# Patient Record
Sex: FEMALE | Race: WHITE | NOT HISPANIC OR LATINO | URBAN - METROPOLITAN AREA
[De-identification: names, ages, dates, MRNs, and addresses within clinical notes are randomized per-mention and may not be internally consistent; named-entity substitution may affect disease eponyms.]

---

## 2017-03-30 ENCOUNTER — APPOINTMENT (OUTPATIENT)
Age: 37
Setting detail: DERMATOLOGY
End: 2017-03-30

## 2017-03-30 ENCOUNTER — APPOINTMENT (RX ONLY)
Age: 37
Setting detail: DERMATOLOGY
End: 2017-03-30

## 2017-03-30 VITALS
SYSTOLIC BLOOD PRESSURE: 104 MMHG | SYSTOLIC BLOOD PRESSURE: 104 MMHG | DIASTOLIC BLOOD PRESSURE: 74 MMHG | DIASTOLIC BLOOD PRESSURE: 74 MMHG

## 2017-03-30 DIAGNOSIS — D18.0 HEMANGIOMA: ICD-10-CM

## 2017-03-30 DIAGNOSIS — D485 NEOPLASM OF UNCERTAIN BEHAVIOR OF SKIN: ICD-10-CM

## 2017-03-30 DIAGNOSIS — L81.4 OTHER MELANIN HYPERPIGMENTATION: ICD-10-CM

## 2017-03-30 DIAGNOSIS — Z87.2 PERSONAL HISTORY OF DISEASES OF THE SKIN AND SUBCUTANEOUS TISSUE: ICD-10-CM

## 2017-03-30 DIAGNOSIS — D22 MELANOCYTIC NEVI: ICD-10-CM

## 2017-03-30 PROBLEM — L29.8 OTHER PRURITUS: Status: ACTIVE | Noted: 2017-03-30

## 2017-03-30 PROBLEM — D22.5 MELANOCYTIC NEVI OF TRUNK: Status: ACTIVE | Noted: 2017-03-30

## 2017-03-30 PROBLEM — D18.01 HEMANGIOMA OF SKIN AND SUBCUTANEOUS TISSUE: Status: ACTIVE | Noted: 2017-03-30

## 2017-03-30 PROBLEM — D48.5 NEOPLASM OF UNCERTAIN BEHAVIOR OF SKIN: Status: ACTIVE | Noted: 2017-03-30

## 2017-03-30 PROCEDURE — 99214 OFFICE O/P EST MOD 30 MIN: CPT | Mod: 25

## 2017-03-30 PROCEDURE — 11100: CPT

## 2017-03-30 PROCEDURE — ? COUNSELING

## 2017-03-30 PROCEDURE — ? BIOPSY BY SHAVE METHOD

## 2017-03-30 ASSESSMENT — LOCATION DETAILED DESCRIPTION DERM
LOCATION DETAILED: MIDDLE STERNUM
LOCATION DETAILED: RIGHT LATERAL SUPERIOR CHEST
LOCATION DETAILED: RIGHT LATERAL SUPERIOR CHEST
LOCATION DETAILED: LEFT ANTERIOR PROXIMAL THIGH
LOCATION DETAILED: RIGHT SUPERIOR MEDIAL UPPER BACK
LOCATION DETAILED: LEFT ANTERIOR PROXIMAL THIGH
LOCATION DETAILED: RIGHT SUPERIOR MEDIAL UPPER BACK
LOCATION DETAILED: LEFT MID-UPPER BACK
LOCATION DETAILED: SUPERIOR THORACIC SPINE
LOCATION DETAILED: SUPERIOR THORACIC SPINE
LOCATION DETAILED: MIDDLE STERNUM
LOCATION DETAILED: LEFT MID-UPPER BACK

## 2017-03-30 ASSESSMENT — LOCATION SIMPLE DESCRIPTION DERM
LOCATION SIMPLE: LEFT THIGH
LOCATION SIMPLE: LEFT UPPER BACK
LOCATION SIMPLE: CHEST
LOCATION SIMPLE: RIGHT UPPER BACK
LOCATION SIMPLE: LEFT THIGH
LOCATION SIMPLE: UPPER BACK
LOCATION SIMPLE: RIGHT UPPER BACK
LOCATION SIMPLE: CHEST
LOCATION SIMPLE: LEFT UPPER BACK
LOCATION SIMPLE: UPPER BACK

## 2017-03-30 ASSESSMENT — LOCATION ZONE DERM
LOCATION ZONE: TRUNK
LOCATION ZONE: LEG
LOCATION ZONE: TRUNK
LOCATION ZONE: LEG

## 2017-03-30 NOTE — PROCEDURE: MIPS QUALITY
Detail Level: Detailed
Quality 431: Preventive Care And Screening: Unhealthy Alcohol Use - Screening: Patient screened for unhealthy alcohol use using a single question and scores less than 2 times per year
Quality 110: Preventive Care And Screening: Influenza Immunization: Influenza Immunization Ordered or Recommended, but not Administered
Quality 226: Preventive Care And Screening: Tobacco Use: Screening And Cessation Intervention: Patient screened for tobacco and never smoked
Quality 111:Pneumonia Vaccination Status For Older Adults: Pneumococcal Vaccination not Administered or Previously Received, Reason not Otherwise Specified
Quality 130: Documentation Of Current Medications In The Medical Record: Current Medications Documented

## 2017-03-30 NOTE — PROCEDURE: BIOPSY BY SHAVE METHOD
Cryotherapy Text: The wound bed was treated with cryotherapy after the biopsy was performed.
Wound Care: Bacitracin
Render Post-Care Instructions In Note?: yes
Biopsy Type: H and E
Curettage Text: The wound bed was treated with curettage after the biopsy was performed.
Consent was obtained and risks were reviewed including but not limited to scarring, infection, bleeding, scabbing, incomplete removal, nerve damage and allergy to anesthesia.
Silver Nitrate Text: The wound bed was treated with silver nitrate after the biopsy was performed.
Anesthesia Type: 0.5% lidocaine with 1:200,000 epinephrine and a 1:10 solution of 8.4% sodium bicarbonate
Anesthesia Volume In Cc: 0.5
Electrodesiccation And Curettage Text: The wound bed was treated with electrodesiccation and curettage after the biopsy was performed.
Electrodesiccation Text: The wound bed was treated with electrodesiccation after the biopsy was performed.
Bill For Surgical Tray: no
Notification Instructions: Patient will be notified of biopsy results. However, patient instructed to call the office if not contacted within 2 weeks.
Dressing: bandage
Hemostasis: Aluminum Chloride
Body Location Override (Optional - Billing Will Still Be Based On Selected Body Map Location If Applicable): Left proximal Posterior Medial Thigh
X Size Of Lesion In Cm: 0
Post-Care Instructions: I reviewed with the patient in detail post-care instructions. Patient is to keep the biopsy site dry overnight, wash with soap and water and then apply ointment daily healed.
Detail Level: Simple
Billing Type: Third-Party Bill
Biopsy Method: Double edge Personna blades
Size Of Lesion In Cm: 0.3
Type Of Destruction Used: Curettage

## 2017-03-30 NOTE — PROCEDURE: MIPS QUALITY
Quality 110: Preventive Care And Screening: Influenza Immunization: Influenza Immunization Ordered or Recommended, but not Administered
Quality 226: Preventive Care And Screening: Tobacco Use: Screening And Cessation Intervention: Patient screened for tobacco and never smoked
Quality 111:Pneumonia Vaccination Status For Older Adults: Pneumococcal Vaccination not Administered or Previously Received, Reason not Otherwise Specified
Quality 130: Documentation Of Current Medications In The Medical Record: Current Medications Documented
Detail Level: Detailed
Quality 431: Preventive Care And Screening: Unhealthy Alcohol Use - Screening: Patient screened for unhealthy alcohol use using a single question and scores less than 2 times per year

## 2017-03-30 NOTE — PROCEDURE: BIOPSY BY SHAVE METHOD
Additional Anesthesia Volume In Cc (Will Not Render If 0): 0
Bill [id number] For Specimen Handling/Conveyance To Laboratory?: no
Lab: -[id number]
Wound Care: Bacitracin
Billing Type: Third-Party Bill
Notification Instructions: Patient will be notified of biopsy results. However, patient instructed to call the office if not contacted within 2 weeks.
Cryotherapy Text: The wound bed was treated with cryotherapy after the biopsy was performed.
Detail Level: Simple
Anesthesia Volume In Cc: 0.5
Body Location Override (Optional - Billing Will Still Be Based On Selected Body Map Location If Applicable): Left proximal Posterior Medial Thigh
Silver Nitrate Text: The wound bed was treated with silver nitrate after the biopsy was performed.
Dressing: bandage
Size Of Lesion In Cm: 0.3
Curettage Text: The wound bed was treated with curettage after the biopsy was performed.
Biopsy Method: Double edge Personna blades
Anesthesia Type: 0.5% lidocaine with 1:200,000 epinephrine and a 1:10 solution of 8.4% sodium bicarbonate
Post-Care Instructions: I reviewed with the patient in detail post-care instructions. Patient is to keep the biopsy site dry overnight, wash with soap and water and then apply ointment daily healed.
Render Post-Care Instructions In Note?: yes
Biopsy Type: H and E
Hemostasis: Aluminum Chloride
Consent was obtained and risks were reviewed including but not limited to scarring, infection, bleeding, scabbing, incomplete removal, nerve damage and allergy to anesthesia.
Electrodesiccation And Curettage Text: The wound bed was treated with electrodesiccation and curettage after the biopsy was performed.
Electrodesiccation Text: The wound bed was treated with electrodesiccation after the biopsy was performed.
Type Of Destruction Used: Curettage

## 2017-09-20 ENCOUNTER — APPOINTMENT (OUTPATIENT)
Age: 37
Setting detail: DERMATOLOGY
End: 2017-09-20

## 2017-09-20 ENCOUNTER — APPOINTMENT (RX ONLY)
Age: 37
Setting detail: DERMATOLOGY
End: 2017-09-20

## 2017-09-20 VITALS
DIASTOLIC BLOOD PRESSURE: 68 MMHG | DIASTOLIC BLOOD PRESSURE: 68 MMHG | SYSTOLIC BLOOD PRESSURE: 104 MMHG | SYSTOLIC BLOOD PRESSURE: 104 MMHG

## 2017-09-20 DIAGNOSIS — H61.03 CHONDRITIS OF EXTERNAL EAR: ICD-10-CM

## 2017-09-20 DIAGNOSIS — D22 MELANOCYTIC NEVI: ICD-10-CM

## 2017-09-20 PROBLEM — D22.5 MELANOCYTIC NEVI OF TRUNK: Status: ACTIVE | Noted: 2017-09-20

## 2017-09-20 PROBLEM — H61.031 CHONDRITIS OF RIGHT EXTERNAL EAR: Status: ACTIVE | Noted: 2017-09-20

## 2017-09-20 PROCEDURE — ? COUNSELING

## 2017-09-20 PROCEDURE — 99214 OFFICE O/P EST MOD 30 MIN: CPT

## 2017-09-20 ASSESSMENT — LOCATION ZONE DERM
LOCATION ZONE: EAR
LOCATION ZONE: TRUNK
LOCATION ZONE: EAR
LOCATION ZONE: TRUNK

## 2017-09-20 ASSESSMENT — LOCATION DETAILED DESCRIPTION DERM
LOCATION DETAILED: RIGHT SUPERIOR CRUS OF ANTIHELIX
LOCATION DETAILED: RIGHT SUPERIOR MEDIAL UPPER BACK
LOCATION DETAILED: LEFT MEDIAL SUPERIOR CHEST
LOCATION DETAILED: RIGHT SUPERIOR MEDIAL UPPER BACK
LOCATION DETAILED: RIGHT SUPERIOR CRUS OF ANTIHELIX
LOCATION DETAILED: LEFT MEDIAL SUPERIOR CHEST

## 2017-09-20 ASSESSMENT — LOCATION SIMPLE DESCRIPTION DERM
LOCATION SIMPLE: RIGHT UPPER BACK
LOCATION SIMPLE: RIGHT UPPER BACK
LOCATION SIMPLE: CHEST
LOCATION SIMPLE: CHEST
LOCATION SIMPLE: RIGHT EAR
LOCATION SIMPLE: RIGHT EAR

## 2017-09-20 NOTE — PROCEDURE: MIPS QUALITY
Quality 226: Preventive Care And Screening: Tobacco Use: Screening And Cessation Intervention: Patient screened for tobacco and never smoked
Detail Level: Detailed
Quality 431: Preventive Care And Screening: Unhealthy Alcohol Use - Screening: Patient screened for unhealthy alcohol use using a single question and scores less than 2 times per year
Quality 111:Pneumonia Vaccination Status For Older Adults: Pneumococcal Vaccination not Administered or Previously Received, Reason not Otherwise Specified
Quality 110: Preventive Care And Screening: Influenza Immunization: Influenza Immunization Ordered or Recommended, but not Administered due to system reason
Quality 130: Documentation Of Current Medications In The Medical Record: Current Medications Documented

## 2018-06-28 ENCOUNTER — APPOINTMENT (RX ONLY)
Age: 38
Setting detail: DERMATOLOGY
End: 2018-06-28

## 2018-06-28 ENCOUNTER — APPOINTMENT (OUTPATIENT)
Age: 38
Setting detail: DERMATOLOGY
End: 2018-06-28

## 2018-06-28 DIAGNOSIS — Z41.9 ENCOUNTER FOR PROCEDURE FOR PURPOSES OTHER THAN REMEDYING HEALTH STATE, UNSPECIFIED: ICD-10-CM

## 2018-06-28 PROCEDURE — ? PRODUCT LINE (ANTI-AGING)

## 2018-06-28 ASSESSMENT — LOCATION SIMPLE DESCRIPTION DERM
LOCATION SIMPLE: LEFT CHEEK
LOCATION SIMPLE: LEFT CHEEK

## 2018-06-28 ASSESSMENT — LOCATION ZONE DERM
LOCATION ZONE: FACE
LOCATION ZONE: FACE

## 2018-06-28 ASSESSMENT — LOCATION DETAILED DESCRIPTION DERM
LOCATION DETAILED: LEFT SUPERIOR CENTRAL MALAR CHEEK
LOCATION DETAILED: LEFT SUPERIOR CENTRAL MALAR CHEEK

## 2018-06-28 NOTE — PROCEDURE: PRODUCT LINE (ANTI-AGING)
Send Charges To Patient Encounter: Yes
Product 22 Units: 0
Name Of Product 52: Acne Kit
Product 63 Price (In Dollars - Numeric Only, No Special Characters Or $): 0.00
Product 15 Application Directions: Apply to entire face as directed
Product 40 Application Directions: Apply to entire face BID right after cleansing and toning
Product 27 Price (In Dollars - Numeric Only, No Special Characters Or $): 74.55
Product 45 Price (In Dollars - Numeric Only, No Special Characters Or $): 45
Product 37 Application Directions: Qam
Product 13 Application Directions: Apply to entire face as directed and Peel protocol given to do a mild peel with 10 pumps BID
Product 6 Application Directions: Apply to entire face BID
Name Of Product 48: Norbertove
Product 51 Application Directions: Apply every other night as tolerated
Product 20 Price (In Dollars - Numeric Only, No Special Characters Or $): 154.43
Product 50 Application Directions: As directed
Name Of Product 45: Sulfa Mask
Product 36 Application Directions: Twice a day post Daily Power Defense or [employer]
Product 36 Price (In Dollars - Numeric Only, No Special Characters Or $): 88
Name Of Product 37: Smartone 50 SPF
Product 22 Application Directions: Apply to face as directed on packaging
Name Of Product 21: Ossentials SPF Primer
Product 33 Price (In Dollars - Numeric Only, No Special Characters Or $): 181.05
Product 21 Application Directions: Apply QAM
Product 24 Application Directions: Cleanse face and neck BID
Product 43 Price (In Dollars - Numeric Only, No Special Characters Or $): 120.00
Product 12 Application Directions: Cleanse face BID
Product 47 Application Directions: Exfoliate face after cleansing 3 times a week
Name Of Product 49: Exfoliating cleanser travel size
Name Of Product 33: Radical night repair
Name Of Product 34: Exfoliating cleanser
Product 16 Price (In Dollars - Numeric Only, No Special Characters Or $): 104
Name Of Product 25: Ossentials SPF 30 Primer
Product 11 Application Directions: Swab to entire face BID after cleansing.
Product 53 Application Directions: Apply QHS to transition from HQ4 therapy.
Product 2 Application Directions: Apply to periorbital region BID
Name Of Product 9: Gentle Cleanser
Name Of Product 50: Skin care kit Level I
Name Of Product 17: C Bright
Name Of Product 29: LetsVenture SPF Travel size
Product 52 Price (In Dollars - Numeric Only, No Special Characters Or $): 140
Product 40 Price (In Dollars - Numeric Only, No Special Characters Or $): 63.90
Product 20 Application Directions: Apply to entire face QHS PRN
Product 25 Price (In Dollars - Numeric Only, No Special Characters Or $): 65
Name Of Product 5: Smartone SPF 50
Name Of Product 28: TE PADS
Product 7 Application Directions: BID
Product 16 Application Directions: Apply to entire face QHS
Product 27 Application Directions: Apply to entire face QAM
Name Of Product 1: Brightenex 1%
Product 48 Price (In Dollars - Numeric Only, No Special Characters Or $): 120
Name Of Product 51: Retamax travel size
Product 7 Price (In Dollars - Numeric Only, No Special Characters Or $): 125
Name Of Product 3: Growth Factor Serum
Product 23 Price (In Dollars - Numeric Only, No Special Characters Or $): 138.45
Name Of Product 7: Melamin C
Name Of Product 14: Hydrafirm
Product 3 Price (In Dollars - Numeric Only, No Special Characters Or $): 157.62
Name Of Product 36: Rozatrol
Name Of Product 27: Exfoliating accelerator
Name Of Product 44: Colorscience Holiday Kit
Product 52 Application Directions: Apply as indicated BID
Name Of Product 20: Wrinkle-texture Repair (former Retamax)
Product 17 Price (In Dollars - Numeric Only, No Special Characters Or $): 99.05
Product 9 Price (In Dollars - Numeric Only, No Special Characters Or $): 47.93
Name Of Product 12: Suresh
Name Of Product 10: Dual action scrub
Product 41 Application Directions: Apply to face and neck once a day
Product 8 Price (In Dollars - Numeric Only, No Special Characters Or $): 255.19
Product 23 Application Directions: Periorbital BID
Product 23 Units: 1
Product 4 Application Directions: Exfoliate 3 x week in mornings
Product 28 Price (In Dollars - Numeric Only, No Special Characters Or $): 54.32
Name Of Product 30: Social Pulse Travel Size
Product 47 Price (In Dollars - Numeric Only, No Special Characters Or $): 18
Name Of Product 42: Intensive eye repair
Name Of Product 8: ZO Level II Antiaging kit
Name Of Product 24: Clarisonic Hortensia 2
Product 10 Price (In Dollars - Numeric Only, No Special Characters Or $): 80
Product 32 Application Directions: Apply to dark circles QHS
Name Of Product 53: Retinol Skin Brightener 0.5%
Name Of Product 13: ZO SKIN HEALTH AGGRESSIVE ANTI-AGING LEVEL III
Product 44 Price (In Dollars - Numeric Only, No Special Characters Or $): 39
Name Of Product 38: Advanced Radical Night Repair
Product 26 Price (In Dollars - Numeric Only, No Special Characters Or $): 154.42
Name Of Product 4: Exfoliating polish
Product 15 Price (In Dollars - Numeric Only, No Special Characters Or $): 318.43
Product 37 Price (In Dollars - Numeric Only, No Special Characters Or $): 74.9
Name Of Product 35: Vitascrub travel size
Product 19 Price (In Dollars - Numeric Only, No Special Characters Or $): 93.72
Product 50 Price (In Dollars - Numeric Only, No Special Characters Or $): 170.40
Product 6 Price (In Dollars - Numeric Only, No Special Characters Or $): 159.77
Product 2 Price (In Dollars - Numeric Only, No Special Characters Or $): 130
Product 34 Price (In Dollars - Numeric Only, No Special Characters Or $): 41.54
Name Of Product 31: ZO Skin Health Level I
Product 11 Price (In Dollars - Numeric Only, No Special Characters Or $): 66.03
Product 21 Price (In Dollars - Numeric Only, No Special Characters Or $): 69.55
Product 5 Application Directions: Apply QAM before makeup
Product 30 Application Directions: Apply a pump 2 x week at bedtime following Daily Power Defense
Product 31 Price (In Dollars - Numeric Only, No Special Characters Or $): 160
Product 38 Application Directions: Every night as tolerated
Product 14 Application Directions: Apply to periorbital region 3 x week at night
Product 35 Price (In Dollars - Numeric Only, No Special Characters Or $): 50.05
Product 41 Price (In Dollars - Numeric Only, No Special Characters Or $): 103
Product 1 Application Directions: Apply to entire face every two nights.
Name Of Product 15: Antiaging level 3 kit
Product 43 Application Directions: Apply to entire face twice daily
Detail Level: Zone
Product 10 Application Directions: Scrub entire face QD in the shower
Product 30 Price (In Dollars - Numeric Only, No Special Characters Or $): 56.00
Product 46 Price (In Dollars - Numeric Only, No Special Characters Or $): 56
Name Of Product 39: Colorescience Tan Brush SPF 50
Name Of Product 6: Daily power defense
Product 8 Application Directions: Apply QD as directed.
Product 49 Price (In Dollars - Numeric Only, No Special Characters Or $): 17
Product 38 Price (In Dollars - Numeric Only, No Special Characters Or $): 170
Name Of Product 46: Brightenex 1% travel size
Product 17 Application Directions: Apply BID after cleansing
Product 9 Application Directions: Cleanse entire face BID
Product 22 Price (In Dollars - Numeric Only, No Special Characters Or $): 215
Product 18 Application Directions: Apply to face and neck after washing BID
Name Of Product 22: Normalizing System
Name Of Product 41: Ommerse Renewal cream
Product 34 Application Directions: Wash face BID
Product 53 Price (In Dollars - Numeric Only, No Special Characters Or $): 110.76
Name Of Product 11: Oil control pads
Product 33 Application Directions: Apply to entire face QHS as tolerated
Product 13 Price (In Dollars - Numeric Only, No Special Characters Or $): 318.44
Product 3 Application Directions: Apply at Bedtime
Product 31 Application Directions: Apply to entire face BID as directed.
Product 49 Application Directions: Cleanse face every morning and every night
Product 39 Price (In Dollars - Numeric Only, No Special Characters Or $): 64
Name Of Product 18: Calming toner
Product 28 Application Directions: Apply to entire face after cleansing twice daily
Name Of Product 26: RetQumulox
Product 35 Application Directions: Scrub face QAM or as tolerated
Product 18 Price (In Dollars - Numeric Only, No Special Characters Or $): 39.41
Product 4 Price (In Dollars - Numeric Only, No Special Characters Or $): 71.42
Name Of Product 40: Instant pore refiner
Name Of Product 2: Ossentials Eye repair
Product 24 Price (In Dollars - Numeric Only, No Special Characters Or $): 158.85
Name Of Product 47: Exfoliating Polish Travel size

## 2018-06-28 NOTE — PROCEDURE: PRODUCT LINE (ANTI-AGING)
Name Of Product 30: PaletteApp Travel Size
Name Of Product 23: Intensive Eye Repair
Product 44 Units: 0
Product 4 Price (In Dollars - Numeric Only, No Special Characters Or $): 71.00
Product 15 Application Directions: Apply to entire face as directed
Product 40 Price (In Dollars - Numeric Only, No Special Characters Or $): 63.90
Name Of Product 7: Melamin C
Name Of Product 51: Retamax travel size
Product 39 Price (In Dollars - Numeric Only, No Special Characters Or $): 64
Name Of Product 36: Rozatrol
Product 10 Application Directions: Scrub entire face QD in the shower
Name Of Product 40: Instant pore refiner
Product 25 Price (In Dollars - Numeric Only, No Special Characters Or $): 65
Product 41 Price (In Dollars - Numeric Only, No Special Characters Or $): 103
Product 30 Application Directions: Apply a pump 2 x week at bedtime following Daily Power Defense
Product 53 Application Directions: Apply QHS to transition from HQ4 therapy.
Name Of Product 24: Clarisonic Radha 2
Product 42 Price (In Dollars - Numeric Only, No Special Characters Or $): 138.45
Product 19 Application Directions: Apply to entire face BID
Name Of Product 45: Sulfa Mask
Product 52 Price (In Dollars - Numeric Only, No Special Characters Or $): 140
Name Of Product 47: Exfoliating Polish Travel size
Name Of Product 41: Ommerse Renewal cream
Product 59 Price (In Dollars - Numeric Only, No Special Characters Or $): 0.00
Product 32 Application Directions: Apply to dark circles QHS
Name Of Product 2: Ossentials Eye repair
Product 16 Price (In Dollars - Numeric Only, No Special Characters Or $): 104
Product 22 Application Directions: Apply to face as directed on packaging
Product 12 Price (In Dollars - Numeric Only, No Special Characters Or $): 47.93
Product 11 Price (In Dollars - Numeric Only, No Special Characters Or $): 66.03
Product 18 Price (In Dollars - Numeric Only, No Special Characters Or $): 39.41
Product 52 Application Directions: Apply as indicated BID
Product 6 Price (In Dollars - Numeric Only, No Special Characters Or $): 159.22
Product 38 Application Directions: Every night as tolerated
Name Of Product 10: Dual action scrub
Product 16 Application Directions: Apply to entire face QHS
Detail Level: Zone
Product 23 Application Directions: Periorbital BID
Product 45 Price (In Dollars - Numeric Only, No Special Characters Or $): 45
Name Of Product 22: Normalizing System
Product 3 Price (In Dollars - Numeric Only, No Special Characters Or $): 157.62
Name Of Product 43: Brandieve
Product 5 Application Directions: Apply QAM before makeup
Product 11 Application Directions: Swab to entire face BID after cleansing.
Product 35 Price (In Dollars - Numeric Only, No Special Characters Or $): 50.05
Product 37 Price (In Dollars - Numeric Only, No Special Characters Or $): 74.9
Product 28 Price (In Dollars - Numeric Only, No Special Characters Or $): 54.32
Product 1 Price (In Dollars - Numeric Only, No Special Characters Or $): 120
Product 50 Application Directions: As directed
Allow Plan To Count Towards E/M Coding: Yes
Name Of Product 29: Triage SPF Travel size
Product 3 Application Directions: Apply at Bedtime
Name Of Product 11: Oil control pads
Product 9 Application Directions: Cleanse entire face BID
Product 49 Price (In Dollars - Numeric Only, No Special Characters Or $): 17
Name Of Product 18: Calming toner
Product 31 Application Directions: Apply to entire face BID as directed.
Name Of Product 13: ZO SKIN HEALTH AGGRESSIVE ANTI-AGING LEVEL III
Name Of Product 39: Colorescience Tan Brush SPF 50
Product 13 Application Directions: Apply to entire face as directed and Peel protocol given to do a mild peel with 10 pumps BID
Product 30 Price (In Dollars - Numeric Only, No Special Characters Or $): 56.00
Product 4 Application Directions: Exfoliate 3 x week in mornings
Product 21 Application Directions: Apply QAM
Product 20 Price (In Dollars - Numeric Only, No Special Characters Or $): 154.43
Product 8 Price (In Dollars - Numeric Only, No Special Characters Or $): 255.19
Product 1 Application Directions: Apply to entire face every two nights.
Name Of Product 9: Gentle Cleanser
Name Of Product 35: Vitascrub travel size
Name Of Product 12: Bere
Product 20 Application Directions: Apply to entire face QHS PRN
Product 10 Price (In Dollars - Numeric Only, No Special Characters Or $): 80
Name Of Product 3: Growth Factor Serum
Product 27 Price (In Dollars - Numeric Only, No Special Characters Or $): 74.55
Product 19 Price (In Dollars - Numeric Only, No Special Characters Or $): 93.72
Name Of Product 46: Brightenex 1% travel size
Product 18 Application Directions: Apply to face and neck after washing BID
Product 25 Application Directions: Apply to entire face QAM
Product 53 Price (In Dollars - Numeric Only, No Special Characters Or $): 110.76
Name Of Product 38: Advanced Radical Night Repair
Name Of Product 49: Exfoliating cleanser travel size
Product 40 Application Directions: Apply to entire face BID right after cleansing and toning
Product 13 Price (In Dollars - Numeric Only, No Special Characters Or $): 318.44
Name Of Product 17: C Bright
Name Of Product 27: Exfoliating accelerator
Product 17 Application Directions: Apply BID after cleansing
Product 15 Price (In Dollars - Numeric Only, No Special Characters Or $): 318.43
Product 14 Application Directions: Apply to periorbital region 3 x week at night
Product 8 Application Directions: Apply QD as directed.
Name Of Product 53: Retinol Skin Brightener 0.5%
Name Of Product 1: Brightenex 1%
Name Of Product 31: ZO Skin Health Level I
Product 37 Application Directions: Qam
Name Of Product 34: Exfoliating cleanser
Name Of Product 37: Smartone 50 SPF
Product 35 Application Directions: Scrub face QAM or as tolerated
Product 43 Application Directions: Apply to entire face twice daily
Product 36 Application Directions: Twice a day post Daily Power Defense or [employer]
Product 38 Price (In Dollars - Numeric Only, No Special Characters Or $): 170
Name Of Product 28: TE PADS
Product 22 Price (In Dollars - Numeric Only, No Special Characters Or $): 215
Product 24 Application Directions: Cleanse face and neck BID
Name Of Product 4: Exfoliating polish
Product 47 Application Directions: Exfoliate face after cleansing 3 times a week
Name Of Product 14: Hydrafirm
Name Of Product 25: Ossentials SPF 30 Primer
Product 51 Application Directions: Apply every other night as tolerated
Name Of Product 8: ZO Level II Antiaging kit
Product 2 Price (In Dollars - Numeric Only, No Special Characters Or $): 130
Product 23 Units: 1
Product 43 Price (In Dollars - Numeric Only, No Special Characters Or $): 120.00
Product 26 Price (In Dollars - Numeric Only, No Special Characters Or $): 154.42
Product 50 Price (In Dollars - Numeric Only, No Special Characters Or $): 170.40
Product 36 Price (In Dollars - Numeric Only, No Special Characters Or $): 88
Product 17 Price (In Dollars - Numeric Only, No Special Characters Or $): 99.05
Name Of Product 33: Radical night repair
Name Of Product 20: Wrinkle-texture Repair (former Retamax)
Name Of Product 44: Colorscience Holiday Kit
Name Of Product 5: Smartone SPF 50
Product 33 Price (In Dollars - Numeric Only, No Special Characters Or $): 181.05
Product 2 Application Directions: Apply to periorbital region BID
Name Of Product 52: Acne Kit
Product 31 Price (In Dollars - Numeric Only, No Special Characters Or $): 160
Product 7 Price (In Dollars - Numeric Only, No Special Characters Or $): 125
Product 34 Price (In Dollars - Numeric Only, No Special Characters Or $): 41.54
Product 28 Application Directions: Apply to entire face after cleansing twice daily
Product 44 Price (In Dollars - Numeric Only, No Special Characters Or $): 39
Product 7 Application Directions: BID
Product 46 Price (In Dollars - Numeric Only, No Special Characters Or $): 56
Product 33 Application Directions: Apply to entire face QHS as tolerated
Product 24 Price (In Dollars - Numeric Only, No Special Characters Or $): 158.60
Product 12 Application Directions: Cleanse face BID
Name Of Product 21: Ossentials SPF Primer
Product 21 Price (In Dollars - Numeric Only, No Special Characters Or $): 69.55
Product 47 Price (In Dollars - Numeric Only, No Special Characters Or $): 18
Product 34 Application Directions: Wash face BID
Name Of Product 15: Antiaging level 3 kit
Product 49 Application Directions: Cleanse face every morning and every night
Name Of Product 50: Skin care kit Level I
Name Of Product 26: RetPure Elegance TVx
Name Of Product 6: Daily power defense
Product 41 Application Directions: Apply to face and neck once a day

## 2024-10-30 ENCOUNTER — APPOINTMENT (OUTPATIENT)
Age: 44
Setting detail: DERMATOLOGY
End: 2024-10-30

## 2024-10-30 ENCOUNTER — APPOINTMENT (RX ONLY)
Age: 44
Setting detail: DERMATOLOGY
End: 2024-10-30

## 2024-10-30 VITALS
DIASTOLIC BLOOD PRESSURE: 73 MMHG | SYSTOLIC BLOOD PRESSURE: 116 MMHG | SYSTOLIC BLOOD PRESSURE: 116 MMHG | DIASTOLIC BLOOD PRESSURE: 73 MMHG

## 2024-10-30 DIAGNOSIS — D22 MELANOCYTIC NEVI: ICD-10-CM

## 2024-10-30 DIAGNOSIS — D485 NEOPLASM OF UNCERTAIN BEHAVIOR OF SKIN: ICD-10-CM

## 2024-10-30 DIAGNOSIS — L81.4 OTHER MELANIN HYPERPIGMENTATION: ICD-10-CM

## 2024-10-30 DIAGNOSIS — D18.0 HEMANGIOMA: ICD-10-CM

## 2024-10-30 PROBLEM — D22.61 MELANOCYTIC NEVI OF RIGHT UPPER LIMB, INCLUDING SHOULDER: Status: ACTIVE | Noted: 2024-10-30

## 2024-10-30 PROBLEM — D22.39 MELANOCYTIC NEVI OF OTHER PARTS OF FACE: Status: ACTIVE | Noted: 2024-10-30

## 2024-10-30 PROBLEM — D22.71 MELANOCYTIC NEVI OF RIGHT LOWER LIMB, INCLUDING HIP: Status: ACTIVE | Noted: 2024-10-30

## 2024-10-30 PROBLEM — D18.01 HEMANGIOMA OF SKIN AND SUBCUTANEOUS TISSUE: Status: ACTIVE | Noted: 2024-10-30

## 2024-10-30 PROBLEM — D48.5 NEOPLASM OF UNCERTAIN BEHAVIOR OF SKIN: Status: ACTIVE | Noted: 2024-10-30

## 2024-10-30 PROBLEM — D22.72 MELANOCYTIC NEVI OF LEFT LOWER LIMB, INCLUDING HIP: Status: ACTIVE | Noted: 2024-10-30

## 2024-10-30 PROBLEM — D22.5 MELANOCYTIC NEVI OF TRUNK: Status: ACTIVE | Noted: 2024-10-30

## 2024-10-30 PROBLEM — D22.62 MELANOCYTIC NEVI OF LEFT UPPER LIMB, INCLUDING SHOULDER: Status: ACTIVE | Noted: 2024-10-30

## 2024-10-30 PROCEDURE — ? COUNSELING

## 2024-10-30 PROCEDURE — 99203 OFFICE O/P NEW LOW 30 MIN: CPT

## 2024-10-30 ASSESSMENT — LOCATION ZONE DERM
LOCATION ZONE: ARM
LOCATION ZONE: TRUNK
LOCATION ZONE: FACE
LOCATION ZONE: LEG
LOCATION ZONE: FACE
LOCATION ZONE: TRUNK
LOCATION ZONE: LEG
LOCATION ZONE: ARM

## 2024-10-30 ASSESSMENT — LOCATION SIMPLE DESCRIPTION DERM
LOCATION SIMPLE: CHEST
LOCATION SIMPLE: RIGHT CHEEK
LOCATION SIMPLE: RIGHT THIGH
LOCATION SIMPLE: LEFT TEMPLE
LOCATION SIMPLE: RIGHT CHEEK
LOCATION SIMPLE: LEFT LOWER BACK
LOCATION SIMPLE: RIGHT PRETIBIAL REGION
LOCATION SIMPLE: ABDOMEN
LOCATION SIMPLE: CHEST
LOCATION SIMPLE: LEFT THIGH
LOCATION SIMPLE: UPPER BACK
LOCATION SIMPLE: RIGHT POSTERIOR UPPER ARM
LOCATION SIMPLE: LEFT POSTERIOR UPPER ARM
LOCATION SIMPLE: LEFT CHEEK
LOCATION SIMPLE: LEFT LOWER BACK
LOCATION SIMPLE: LEFT PRETIBIAL REGION
LOCATION SIMPLE: UPPER BACK
LOCATION SIMPLE: LEFT CHEEK
LOCATION SIMPLE: LEFT TEMPLE
LOCATION SIMPLE: LEFT UPPER BACK
LOCATION SIMPLE: LEFT POSTERIOR UPPER ARM
LOCATION SIMPLE: LEFT UPPER BACK
LOCATION SIMPLE: RIGHT PRETIBIAL REGION
LOCATION SIMPLE: RIGHT POSTERIOR UPPER ARM
LOCATION SIMPLE: RIGHT THIGH
LOCATION SIMPLE: LEFT PRETIBIAL REGION
LOCATION SIMPLE: LEFT THIGH
LOCATION SIMPLE: ABDOMEN

## 2024-10-30 ASSESSMENT — LOCATION DETAILED DESCRIPTION DERM
LOCATION DETAILED: RIGHT ANTERIOR DISTAL THIGH
LOCATION DETAILED: LEFT ANTERIOR DISTAL THIGH
LOCATION DETAILED: LEFT CENTRAL TEMPLE
LOCATION DETAILED: RIGHT DISTAL PRETIBIAL REGION
LOCATION DETAILED: INFERIOR THORACIC SPINE
LOCATION DETAILED: UPPER STERNUM
LOCATION DETAILED: LEFT PROXIMAL POSTERIOR UPPER ARM
LOCATION DETAILED: RIGHT PROXIMAL POSTERIOR UPPER ARM
LOCATION DETAILED: LEFT INFERIOR CENTRAL MALAR CHEEK
LOCATION DETAILED: RIGHT MEDIAL SUPERIOR CHEST
LOCATION DETAILED: LEFT SUPERIOR UPPER BACK
LOCATION DETAILED: LEFT INFERIOR MEDIAL MIDBACK
LOCATION DETAILED: RIGHT ANTERIOR DISTAL THIGH
LOCATION DETAILED: LEFT DISTAL POSTERIOR UPPER ARM
LOCATION DETAILED: LEFT ANTERIOR DISTAL THIGH
LOCATION DETAILED: RIGHT ANTERIOR PROXIMAL THIGH
LOCATION DETAILED: EPIGASTRIC SKIN
LOCATION DETAILED: RIGHT PROXIMAL POSTERIOR UPPER ARM
LOCATION DETAILED: LEFT CENTRAL TEMPLE
LOCATION DETAILED: RIGHT ANTERIOR PROXIMAL THIGH
LOCATION DETAILED: LEFT ANTERIOR LATERAL DISTAL THIGH
LOCATION DETAILED: PERIUMBILICAL SKIN
LOCATION DETAILED: RIGHT DISTAL POSTERIOR UPPER ARM
LOCATION DETAILED: PERIUMBILICAL SKIN
LOCATION DETAILED: LEFT LATERAL SUPERIOR CHEST
LOCATION DETAILED: LEFT PROXIMAL PRETIBIAL REGION
LOCATION DETAILED: LEFT LATERAL SUPERIOR CHEST
LOCATION DETAILED: LEFT PROXIMAL PRETIBIAL REGION
LOCATION DETAILED: INFERIOR THORACIC SPINE
LOCATION DETAILED: LEFT PROXIMAL POSTERIOR UPPER ARM
LOCATION DETAILED: LEFT ANTERIOR LATERAL DISTAL THIGH
LOCATION DETAILED: EPIGASTRIC SKIN
LOCATION DETAILED: LEFT DISTAL POSTERIOR UPPER ARM
LOCATION DETAILED: RIGHT DISTAL POSTERIOR UPPER ARM
LOCATION DETAILED: RIGHT INFERIOR CENTRAL MALAR CHEEK
LOCATION DETAILED: RIGHT INFERIOR CENTRAL MALAR CHEEK
LOCATION DETAILED: RIGHT DISTAL PRETIBIAL REGION
LOCATION DETAILED: UPPER STERNUM
LOCATION DETAILED: LEFT INFERIOR MEDIAL MIDBACK
LOCATION DETAILED: RIGHT MEDIAL SUPERIOR CHEST
LOCATION DETAILED: LEFT SUPERIOR UPPER BACK
LOCATION DETAILED: LEFT INFERIOR CENTRAL MALAR CHEEK